# Patient Record
Sex: FEMALE | Race: BLACK OR AFRICAN AMERICAN | NOT HISPANIC OR LATINO | URBAN - NONMETROPOLITAN AREA
[De-identification: names, ages, dates, MRNs, and addresses within clinical notes are randomized per-mention and may not be internally consistent; named-entity substitution may affect disease eponyms.]

---

## 2020-01-03 NOTE — PROCEDURE NOTE: SURGICAL
Prior to commencing surgery patient identification, surgical procedure, site, and side were confirmed by Dr. Jay Petersen.&nbsp; Following topical proparacaine anesthesia, the patient was positioned at the YAG laser, a contact lens coupled to the cornea of the right eye with methylcellulose and an axial posterior capsulotomy performed without complication using 3.0 Mj x 36. Attention was then turned to the left eye and a contact lens coupled to the cornea of the left eye with methylcellulose and an axial posterior capsulotomy performed without complication using 1.0RQ x 30. Excess methylcellulose was washed from the eye, one drop of Alphagan was instilled and the patient returned to the holding area having tolerated the procedure well and without complication. <br />MRN: [id number]<br /><br />

## 2020-01-28 ENCOUNTER — IMPORTED ENCOUNTER (OUTPATIENT)
Dept: URBAN - NONMETROPOLITAN AREA CLINIC 1 | Facility: CLINIC | Age: 20
End: 2020-01-28

## 2020-01-28 PROBLEM — H52.223: Noted: 2020-01-28

## 2020-01-28 PROBLEM — H52.13: Noted: 2020-01-28

## 2020-01-28 PROCEDURE — S0620 ROUTINE OPHTHALMOLOGICAL EXA: HCPCS

## 2022-01-17 NOTE — PATIENT DISCUSSION
Vinson adwoa, 5^ Eso and 4^ RH, equal in all gazes. Trial framed 2^BU OS and 3 LORETTA OS. Patient sees diplopia infrequently and would like to hold off on prism until symptoms worsen.

## 2022-04-09 ASSESSMENT — VISUAL ACUITY
OD_SC: 20/20-
OS_SC: 20/20-

## 2022-04-09 ASSESSMENT — TONOMETRY
OS_IOP_MMHG: 13
OD_IOP_MMHG: 13